# Patient Record
Sex: FEMALE | Race: WHITE | NOT HISPANIC OR LATINO | URBAN - METROPOLITAN AREA
[De-identification: names, ages, dates, MRNs, and addresses within clinical notes are randomized per-mention and may not be internally consistent; named-entity substitution may affect disease eponyms.]

---

## 2017-11-03 ENCOUNTER — EMERGENCY (EMERGENCY)
Facility: HOSPITAL | Age: 42
LOS: 1 days | Discharge: HOME | End: 2017-11-03
Attending: EMERGENCY MEDICINE
Payer: COMMERCIAL

## 2017-11-03 VITALS
HEART RATE: 74 BPM | DIASTOLIC BLOOD PRESSURE: 84 MMHG | OXYGEN SATURATION: 100 % | SYSTOLIC BLOOD PRESSURE: 138 MMHG | RESPIRATION RATE: 18 BRPM

## 2017-11-03 VITALS — WEIGHT: 265 LBS | HEIGHT: 61 IN

## 2017-11-03 PROCEDURE — 99283 EMERGENCY DEPT VISIT LOW MDM: CPT

## 2017-11-03 RX ORDER — METHOCARBAMOL 500 MG/1
750 TABLET, FILM COATED ORAL ONCE
Qty: 0 | Refills: 0 | Status: COMPLETED | OUTPATIENT
Start: 2017-11-03 | End: 2017-11-03

## 2017-11-03 RX ORDER — METHOCARBAMOL 500 MG/1
2 TABLET, FILM COATED ORAL
Qty: 24 | Refills: 0 | OUTPATIENT
Start: 2017-11-03 | End: 2017-11-06

## 2017-11-03 RX ADMIN — METHOCARBAMOL 750 MILLIGRAM(S): 500 TABLET, FILM COATED ORAL at 12:43

## 2017-11-03 NOTE — ED ADULT NURSE REASSESSMENT NOTE - NS ED NURSE REASSESS COMMENT FT1
Patient verbalized understanding of discharge instructions, need for followup.  Patient also provided with signs and symptoms to return to the emergency department immediately.  Patient A+Ox3, resps even and nonlabored, patient in no apparent distress.  Patient verbalized understanding, at this time patient has no further questions.

## 2017-11-03 NOTE — ED STATDOCS - OBJECTIVE STATEMENT
41 y/o F pt with a PMHx of herniated and degenerative discs, endometriomal CA, gall stones, DM and hysterectomy presents to the ED c/o back pain s/p mehanical fall. Explain that the pain graduallty onset 4 days ago. Today she twisted while standing up at 0930 this AM. She called her "nursing hotline", described her pain and she was told to come to the ED today. She localizes the pain to her R side. Took an aleve 2 hours prior to arrival, and at bedside she states "its feels like it si working." Had an MRI April 2017. Denies abdominal pain, denies numbenss, tingling, chest pain, recent trauam, LOC, heavy objects or any other complaints. Allergic to Penicillin and Erythromycin. 43 y/o F pt with a PMHx of herniated and degenerative discs, endometriomal CA, gall stones, DM and hysterectomy presents to the ED c/o back pain s/p mehanical fall. Explain that the pain graduallty onset 4 days ago. Today she twisted while standing up at 0930 this AM. She called her "nursing hotline", described her pain and she was told to come to the ED today. She localizes the pain to her R side. Took an aleve 2 hours prior to arrival, and at bedside she states "its feels like it si working." Had an MRI April 2017. Denies abdominal pain, denies numbenss, tingling, chest pain, recent trauam, LOC, heavy objects or any other complaints. Allergic to Penicillin and Erythromycin.  No bladder or bowel incontinence, no motor weakness. 43 y/o F pt with a PMHx of herniated and degenerative discs, endometriomal CA, gall stones, DM and hysterectomy presents to the ED c/o back pain s/p injury. Explain that the pain graduallty onset 4 days ago. Today she twisted while standing up at 0930 this AM. She called her "nursing hotline", described her pain and she was told to come to the ED today. She localizes the pain to her R side. Took an aleve 2 hours prior to arrival, and at bedside she states "its feels like it si working." Had an MRI April 2017. Denies abdominal pain, denies numbenss, tingling, chest pain, recent trauam, LOC, heavy objects or any other complaints. Allergic to Penicillin and Erythromycin.  No bladder or bowel incontinence, no motor weakness.

## 2017-11-03 NOTE — ED STATDOCS - PMH
Degenerative disc disease, lumbar    DM (diabetes mellitus)    Gall stone    Herniated disc, cervical

## 2017-11-03 NOTE — ED STATDOCS - CONDUCTED A DETAILED DISCUSSION WITH PATIENT AND/OR GUARDIAN REGARDING, MDM
need for outpatient follow-up/return to ED if symptoms worsen, persist or questions arise lab results/return to ED if symptoms worsen, persist or questions arise/need for outpatient follow-up

## 2017-11-04 DIAGNOSIS — Z90.710 ACQUIRED ABSENCE OF BOTH CERVIX AND UTERUS: Chronic | ICD-10-CM

## 2019-09-03 ENCOUNTER — EMERGENCY (EMERGENCY)
Facility: HOSPITAL | Age: 44
LOS: 1 days | End: 2019-09-03
Admitting: EMERGENCY MEDICINE
Payer: COMMERCIAL

## 2019-09-03 DIAGNOSIS — Z90.710 ACQUIRED ABSENCE OF BOTH CERVIX AND UTERUS: Chronic | ICD-10-CM

## 2019-09-03 PROCEDURE — 99285 EMERGENCY DEPT VISIT HI MDM: CPT

## 2019-09-03 PROCEDURE — 71046 X-RAY EXAM CHEST 2 VIEWS: CPT | Mod: 26

## 2019-09-03 PROCEDURE — 93010 ELECTROCARDIOGRAM REPORT: CPT

## 2021-12-24 ENCOUNTER — TRANSCRIPTION ENCOUNTER (OUTPATIENT)
Age: 46
End: 2021-12-24

## 2022-01-24 PROBLEM — M50.20 OTHER CERVICAL DISC DISPLACEMENT, UNSPECIFIED CERVICAL REGION: Chronic | Status: ACTIVE | Noted: 2017-11-04

## 2022-01-24 PROBLEM — E11.9 TYPE 2 DIABETES MELLITUS WITHOUT COMPLICATIONS: Chronic | Status: ACTIVE | Noted: 2017-11-04

## 2022-01-24 PROBLEM — M51.36 OTHER INTERVERTEBRAL DISC DEGENERATION, LUMBAR REGION: Chronic | Status: ACTIVE | Noted: 2017-11-04

## 2022-01-24 PROBLEM — K80.20 CALCULUS OF GALLBLADDER WITHOUT CHOLECYSTITIS WITHOUT OBSTRUCTION: Chronic | Status: ACTIVE | Noted: 2017-11-04

## 2023-07-27 NOTE — ED ADULT TRIAGE NOTE - SOURCE OF INFORMATION
Infusion Nursing Note:  Hoda Brush presents today for Cycle 101 Day 1 Herceptin.    Patient spoke with provider today: Yes: Dr. Aguiar    Treatment Conditions:  Lab Results   Component Value Date    HGB 12.7 07/27/2023    WBC 7.3 07/27/2023    ANEU 3.1 06/22/2021    ANEUTAUTO 3.9 07/27/2023     07/27/2023        Lab Results   Component Value Date     07/27/2023    POTASSIUM 4.5 07/27/2023    CR 0.79 07/27/2023    TAYLER 9.4 07/27/2023    BILITOTAL 0.3 07/27/2023    ALBUMIN 4.0 07/27/2023    ALT 39 07/27/2023    AST 32 07/27/2023       ECHO/MUGA completed 7/11/23  EF 55%.      Note: No concerns during infusion visit.    Intravenous Access:  Implanted Port.    Post Infusion Assessment:  Patient tolerated infusion without incident.  Blood return noted pre and post infusion.  Access discontinued per protocol.    Discharge Plan:   Patient declined prescription refills.  Discharge instructions reviewed with: Patient.  Patient and/or family verbalized understanding of discharge instructions and all questions answered.  AVS to patient via [facility].  Patient will return 8/17/23 for next appointment.   Patient discharged in stable condition accompanied by: self.  Departure Mode: Ambulatory.    Linda Navarro RN   
Patient

## 2023-08-25 ENCOUNTER — NON-APPOINTMENT (OUTPATIENT)
Age: 48
End: 2023-08-25